# Patient Record
Sex: MALE | Employment: FULL TIME | URBAN - METROPOLITAN AREA
[De-identification: names, ages, dates, MRNs, and addresses within clinical notes are randomized per-mention and may not be internally consistent; named-entity substitution may affect disease eponyms.]

---

## 2019-01-04 ENCOUNTER — OFFICE VISIT (OUTPATIENT)
Dept: PEDIATRICS | Facility: CLINIC | Age: 24
End: 2019-01-04
Payer: COMMERCIAL

## 2019-01-04 VITALS
SYSTOLIC BLOOD PRESSURE: 116 MMHG | HEART RATE: 70 BPM | TEMPERATURE: 98.8 F | OXYGEN SATURATION: 97 % | DIASTOLIC BLOOD PRESSURE: 64 MMHG | HEIGHT: 73 IN | BODY MASS INDEX: 27.67 KG/M2 | WEIGHT: 208.8 LBS

## 2019-01-04 DIAGNOSIS — Z00.00 ROUTINE HISTORY AND PHYSICAL EXAMINATION OF ADULT: Primary | ICD-10-CM

## 2019-01-04 DIAGNOSIS — Z23 NEED FOR PROPHYLACTIC VACCINATION AND INOCULATION AGAINST INFLUENZA: ICD-10-CM

## 2019-01-04 PROCEDURE — 99385 PREV VISIT NEW AGE 18-39: CPT | Mod: 25 | Performed by: INTERNAL MEDICINE

## 2019-01-04 PROCEDURE — 90471 IMMUNIZATION ADMIN: CPT | Performed by: INTERNAL MEDICINE

## 2019-01-04 PROCEDURE — 80061 LIPID PANEL: CPT | Performed by: INTERNAL MEDICINE

## 2019-01-04 PROCEDURE — 90686 IIV4 VACC NO PRSV 0.5 ML IM: CPT | Performed by: INTERNAL MEDICINE

## 2019-01-04 PROCEDURE — 36415 COLL VENOUS BLD VENIPUNCTURE: CPT | Performed by: INTERNAL MEDICINE

## 2019-01-04 PROCEDURE — 82947 ASSAY GLUCOSE BLOOD QUANT: CPT | Performed by: INTERNAL MEDICINE

## 2019-01-04 RX ORDER — MULTIPLE VITAMINS W/ MINERALS TAB 9MG-400MCG
1 TAB ORAL DAILY
COMMUNITY

## 2019-01-04 RX ORDER — IBUPROFEN 200 MG
200 TABLET ORAL DAILY PRN
COMMUNITY
End: 2020-07-22

## 2019-01-04 RX ORDER — ACETAMINOPHEN 500 MG
1000 TABLET ORAL DAILY PRN
COMMUNITY
Start: 2016-01-26 | End: 2020-07-22

## 2019-01-04 ASSESSMENT — ENCOUNTER SYMPTOMS
HEARTBURN: 0
DIZZINESS: 0
WEAKNESS: 0
ABDOMINAL PAIN: 0
DIARRHEA: 0
CHILLS: 0
FEVER: 0
MYALGIAS: 0
HEADACHES: 0
COUGH: 0
PARESTHESIAS: 0
PALPITATIONS: 0
CONSTIPATION: 0
EYE PAIN: 0
FREQUENCY: 0
ARTHRALGIAS: 0
SORE THROAT: 0
HEMATOCHEZIA: 0
HEMATURIA: 0
NAUSEA: 0
JOINT SWELLING: 0
SHORTNESS OF BREATH: 0
DYSURIA: 0
NERVOUS/ANXIOUS: 0

## 2019-01-04 ASSESSMENT — MIFFLIN-ST. JEOR: SCORE: 2000.99

## 2019-01-04 NOTE — PROGRESS NOTES
SUBJECTIVE:   CC: Foreign Niño is an 23 year old male who presents for preventative health visit.     Physical   Annual:     Getting at least 3 servings of Calcium per day:  Yes    Bi-annual eye exam:  NO    Dental care twice a year:  Yes    Sleep apnea or symptoms of sleep apnea:  None    Diet:  Regular (no restrictions)    Frequency of exercise:  4-5 days/week    Duration of exercise:  30-45 minutes    Taking medications regularly:  Not Applicable    Additional concerns today:  No    PHQ-2 Total Score: 0          Today's PHQ-2 Score:   PHQ-2 ( 1999 Pfizer) 1/4/2019   Q1: Little interest or pleasure in doing things 0   Q2: Feeling down, depressed or hopeless 0   PHQ-2 Score 0   Q1: Little interest or pleasure in doing things Not at all   Q2: Feeling down, depressed or hopeless Not at all   PHQ-2 Score 0       Abuse: Current or Past(Physical, Sexual or Emotional)- No  Do you feel safe in your environment? Yes    Social History     Tobacco Use     Smoking status: Not on file   Substance Use Topics     Alcohol use: Not on file     No flowsheet data found.    Last PSA: No results found for: PSA    Reviewed orders with patient. Reviewed health maintenance and updated orders accordingly - Yes  Labs reviewed in EPIC    Reviewed and updated as needed this visit by clinical staff         Reviewed and updated as needed this visit by Provider            Review of Systems   Constitutional: Negative for chills and fever.   HENT: Negative for congestion, ear pain, hearing loss and sore throat.    Eyes: Negative for pain and visual disturbance.   Respiratory: Negative for cough and shortness of breath.    Cardiovascular: Negative for chest pain, palpitations and peripheral edema.   Gastrointestinal: Negative for abdominal pain, constipation, diarrhea, heartburn, hematochezia and nausea.   Genitourinary: Negative for discharge, dysuria, frequency, genital sores, hematuria, impotence and urgency.   Musculoskeletal: Negative for  "arthralgias, joint swelling and myalgias.   Skin: Negative for rash.   Neurological: Negative for dizziness, weakness, headaches and paresthesias.   Psychiatric/Behavioral: Negative for mood changes. The patient is not nervous/anxious.          OBJECTIVE:   /64 (BP Location: Right arm, Patient Position: Chair, Cuff Size: Adult Large)   Pulse 70   Temp 98.8  F (37.1  C) (Oral)   Ht 1.854 m (6' 1\")   Wt 94.7 kg (208 lb 12.8 oz)   SpO2 97%   BMI 27.55 kg/m      Physical Exam  GENERAL: healthy, alert and no distress  EYES: Eyes grossly normal to inspection, PERRL and conjunctivae and sclerae normal  HENT: ear canals and TM's normal, nose and mouth without ulcers or lesions  NECK: no adenopathy, no asymmetry, masses, or scars and thyroid normal to palpation  RESP: lungs clear to auscultation - no rales, rhonchi or wheezes  CV: regular rate and rhythm, normal S1 S2, no S3 or S4, no murmur, click or rub, no peripheral edema and peripheral pulses strong  ABDOMEN: soft, nontender, no hepatosplenomegaly, no masses and bowel sounds normal  MS: no gross musculoskeletal defects noted, no edema  SKIN: no suspicious lesions or rashes  NEURO: Normal strength and tone, mentation intact and speech normal  PSYCH: mentation appears normal, affect normal/bright    Diagnostic Test Results:  Results for orders placed or performed in visit on 01/04/19   Lipid panel reflex to direct LDL Fasting   Result Value Ref Range    Cholesterol 169 <200 mg/dL    Triglycerides 93 <150 mg/dL    HDL Cholesterol 58 >39 mg/dL    LDL Cholesterol Calculated 92 <100 mg/dL    Non HDL Cholesterol 111 <130 mg/dL   Glucose   Result Value Ref Range    Glucose 80 70 - 99 mg/dL       ASSESSMENT/PLAN:       ICD-10-CM    1. Routine history and physical examination of adult Z00.00 Lipid panel reflex to direct LDL Fasting     Glucose   2. Need for prophylactic vaccination and inoculation against influenza Z23 FLU VACCINE, SPLIT VIRUS, IM (QUADRIVALENT) " "[57335]- >3 YRS     Vaccine Administration, Initial [84582]       COUNSELING:   Reviewed preventive health counseling, as reflected in patient instructions    BP Readings from Last 1 Encounters:   01/04/19 116/64     Estimated body mass index is 27.55 kg/m  as calculated from the following:    Height as of this encounter: 1.854 m (6' 1\").    Weight as of this encounter: 94.7 kg (208 lb 12.8 oz).      Weight management plan: Discussed healthy diet and exercise guidelines     reports that  has never smoked. His smokeless tobacco use includes chew.      Counseling Resources:  ATP IV Guidelines  Pooled Cohorts Equation Calculator  FRAX Risk Assessment  ICSI Preventive Guidelines  Dietary Guidelines for Americans, 2010  USDA's MyPlate  ASA Prophylaxis  Lung CA Screening    Alexys Del Valle MD, MD  [facility]  "

## 2019-01-04 NOTE — PROGRESS NOTES
Injectable Influenza Immunization Documentation    1.  Is the person to be vaccinated sick today?   No    2. Does the person to be vaccinated have an allergy to a component   of the vaccine?   No  Egg Allergy Algorithm Link    3. Has the person to be vaccinated ever had a serious reaction   to influenza vaccine in the past?   No    4. Has the person to be vaccinated ever had Guillain-Barré syndrome?   No    Form completed by Lola Little MA           Answers for HPI/ROS submitted by the patient on 1/4/2019   Annual Exam:  Frequency of exercise:: 4-5 days/week  Getting at least 3 servings of Calcium per day:: Yes  Diet:: Regular (no restrictions)  Taking medications regularly:: Not Applicable  Medication side effects:: Not applicable  Bi-annual eye exam:: NO  Dental care twice a year:: Yes  Sleep apnea or symptoms of sleep apnea:: None  Negative for the following: abdominal pain, Blood in stool, Blood in urine, chest pain, chills, congestion, constipation, cough, diarrhea, dizziness, ear pain, eye pain, nervous/anxious, fever, frequency, genital sores, headaches, hearing loss, heartburn, arthralgias, joint swelling, peripheral edema, mood changes, myalgias, nausea, dysuria, palpitations, Skin sensation changes, sore throat, urgency, rash, shortness of breath, visual disturbance, weakness  impotence: No  penile discharge: No  Additional concerns today:: No  Duration of exercise:: 30-45 minutes

## 2019-01-04 NOTE — PATIENT INSTRUCTIONS
1) Flu vaccine    2) Stop at lab downstairs today. If you have Mychart, your results will be sent this way. If you do not have Mychart, you can sign up by following the link in this after visit summary.        Preventive Health Recommendations  Male Ages 21 - 25     Yearly exam:             See your health care provider every year in order to  o   Review health changes.   o   Discuss preventive care.    o   Review your medicines if your doctor has prescribed any.    You should be tested each year for STDs (sexually transmitted diseases).     Talk to your provider about cholesterol testing.      If you are at risk for diabetes, you should have a diabetes test (fasting glucose).    Shots: Get a flu shot each year. Get a tetanus shot every 10 years.     Nutrition:    Eat at least 5 servings of fruits and vegetables daily.     Eat whole-grain bread, whole-wheat pasta and brown rice instead of white grains and rice.     Get adequate calcium and Vitamin D.     Lifestyle    Exercise for at least 150 minutes a week (30 minutes a day, 5 days a week). This will help you control your weight and prevent disease.     Limit alcohol to one drink per day.     No smoking.     Wear sunscreen to prevent skin cancer.     See your dentist every six months for an exam and cleaning.

## 2019-01-05 LAB
CHOLEST SERPL-MCNC: 169 MG/DL
GLUCOSE SERPL-MCNC: 80 MG/DL (ref 70–99)
HDLC SERPL-MCNC: 58 MG/DL
LDLC SERPL CALC-MCNC: 92 MG/DL
NONHDLC SERPL-MCNC: 111 MG/DL
TRIGL SERPL-MCNC: 93 MG/DL

## 2020-07-06 ENCOUNTER — TELEPHONE (OUTPATIENT)
Dept: PEDIATRICS | Facility: CLINIC | Age: 25
End: 2020-07-06

## 2020-07-06 NOTE — TELEPHONE ENCOUNTER
1st attempt:  Due to change in clinic hours, patient's appointment time on 7/22/20 needs to be rescheduled.  Left message for patient informing him of this.  Requested a callback to reschedule time.  Provided clinic number.    Tracee Espino

## 2020-07-17 NOTE — PROGRESS NOTES
Pre-Visit Planning     Future Appointments   Date Time Provider Department Center   7/22/2020  1:20 PM Sb Cunningham MD [facility]     Arrival Time for this Appointment: 12:55 PM   Appointment Notes for this encounter:   Data Unavailable    Questionnaires Reviewed/Assigned  No additional questionnaires are needed        Patient preferred phone number: [phone]    Unable to reach. Left voicemail.

## 2020-07-22 ENCOUNTER — OFFICE VISIT (OUTPATIENT)
Dept: PEDIATRICS | Facility: CLINIC | Age: 25
End: 2020-07-22
Payer: COMMERCIAL

## 2020-07-22 VITALS
HEIGHT: 73 IN | BODY MASS INDEX: 27.96 KG/M2 | DIASTOLIC BLOOD PRESSURE: 72 MMHG | TEMPERATURE: 98.2 F | RESPIRATION RATE: 16 BRPM | WEIGHT: 211 LBS | OXYGEN SATURATION: 99 % | SYSTOLIC BLOOD PRESSURE: 116 MMHG | HEART RATE: 63 BPM

## 2020-07-22 DIAGNOSIS — Z00.00 ROUTINE GENERAL MEDICAL EXAMINATION AT A HEALTH CARE FACILITY: Primary | ICD-10-CM

## 2020-07-22 PROCEDURE — 99395 PREV VISIT EST AGE 18-39: CPT | Performed by: INTERNAL MEDICINE

## 2020-07-22 SDOH — HEALTH STABILITY: MENTAL HEALTH: HOW OFTEN DO YOU HAVE A DRINK CONTAINING ALCOHOL?: 2-4 TIMES A MONTH

## 2020-07-22 SDOH — HEALTH STABILITY: MENTAL HEALTH: HOW OFTEN DO YOU HAVE 6 OR MORE DRINKS ON ONE OCCASION?: MONTHLY

## 2020-07-22 SDOH — HEALTH STABILITY: MENTAL HEALTH: HOW MANY STANDARD DRINKS CONTAINING ALCOHOL DO YOU HAVE ON A TYPICAL DAY?: 5 OR 6

## 2020-07-22 ASSESSMENT — MIFFLIN-ST. JEOR: SCORE: 2002.71

## 2020-07-22 NOTE — PROGRESS NOTES
SUBJECTIVE:   CC: Foreign Niño is an 25 year old male who presents for preventative health visit.     Healthy Habits:    Getting at least 3 servings of Calcium per day:  Yes    Bi-annual eye exam:  Yes    Dental care twice a year:  Yes    Sleep apnea or symptoms of sleep apnea:  None    Diet:  Regular (no restrictions)    Frequency of exercise:  4-5 days/week    Duration of exercise:  45-60 minutes    Taking medications regularly:  Not Applicable    Barriers to taking medications:  Not applicable    Medication side effects:  Not applicable    PHQ-2 Total Score:    Additional concerns today:  No      Eye exam with ophthalmology on this date: 09/2019, [facility](in [place])    Joint Pain    Onset: 10 days, in April, painful to do push ups and thereafter for 10 days.     Description:   Location: right shoulder  Character: cracks/pops    Intensity: mild, wrong movement increases pain    Progression of Symptoms: same    Accompanying Signs & Symptoms:  Other symptoms: none    History:   Previous similar pain: no       Precipitating factors:   Trauma or overuse: YES- push ups    Alleviating factors:  Improved by: rest/inactivity    Sx are resolved at this time       Today's PHQ-2 Score:   PHQ-2 ( 1999 Pfizer) 7/22/2020   Q1: Little interest or pleasure in doing things 0   Q2: Feeling down, depressed or hopeless 0   PHQ-2 Score 0   Q1: Little interest or pleasure in doing things -   Q2: Feeling down, depressed or hopeless -   PHQ-2 Score -     Abuse: Current or Past(Physical, Sexual or Emotional)- No  Do you feel safe in your environment? Yes      Social History     Tobacco Use     Smoking status: Never Smoker     Smokeless tobacco: Current User     Types: Chew   Substance Use Topics     Alcohol use: Yes     Frequency: 2-4 times a month     Drinks per session: 5 or 6     Binge frequency: Monthly     If you drink alcohol do you typically have >3 drinks per day or >7 drinks per week? No    Alcohol Use 1/4/2019  "  Prescreen: >3 drinks/day or >7 drinks/week? Yes       Reviewed orders with patient. Reviewed health maintenance and updated orders accordingly - Yes    Reviewed and updated as needed this visit by Provider  Tobacco  Allergies  Meds  Problems  Med Hx  Surg Hx  Fam Hx          Review of Systems  CONSTITUTIONAL: NEGATIVE for fever, chills, change in weight  INTEGUMENTARY/SKIN: NEGATIVE for worrisome rashes, moles or lesions  EYES: NEGATIVE for vision changes or irritation  ENT: NEGATIVE for ear, mouth and throat problems  RESP: NEGATIVE for significant cough or SOB  CV: NEGATIVE for chest pain, palpitations or peripheral edema  GI: NEGATIVE for nausea, abdominal pain, heartburn, or change in bowel habits   male: negative for dysuria, hematuria, decreased urinary stream, erectile dysfunction, urethral discharge  MUSCULOSKELETAL: NEGATIVE for significant arthralgias or myalgia  NEURO: NEGATIVE for weakness, dizziness or paresthesias  PSYCHIATRIC: NEGATIVE for changes in mood or affect    OBJECTIVE:   /72 (BP Location: Right arm, Patient Position: Sitting, Cuff Size: Adult Regular)   Pulse 63   Temp 98.2  F (36.8  C) (Tympanic)   Resp 16   Ht 1.857 m (6' 1.11\")   Wt 95.7 kg (211 lb)   SpO2 99%   BMI 27.75 kg/m      Physical Exam  GENERAL: healthy, alert and no distress  EYES: Eyes grossly normal to inspection, PERRL and conjunctivae and sclerae normal  HENT: ear canals and TM's normal, nose and mouth without ulcers or lesions  NECK: no adenopathy, no asymmetry, masses, or scars and thyroid normal to palpation  RESP: lungs clear to auscultation - no rales, rhonchi or wheezes  CV: regular rate and rhythm, normal S1 S2, no S3 or S4, no murmur, click or rub, no peripheral edema and peripheral pulses strong  ABDOMEN: soft, nontender, no hepatosplenomegaly, no masses and bowel sounds normal  MS: Per HPI   SKIN: no suspicious lesions or rashes  NEURO: Normal strength and tone, mentation intact and speech " "normal  PSYCH: mentation appears normal, affect normal/bright      ASSESSMENT/PLAN:       ICD-10-CM    1. Routine general medical examination at a health care facility  Z00.00        COUNSELING:   Reviewed preventive health counseling, as reflected in patient instructions    Estimated body mass index is 27.75 kg/m  as calculated from the following:    Height as of this encounter: 1.857 m (6' 1.11\").    Weight as of this encounter: 95.7 kg (211 lb).     Weight management plan: Discussed healthy diet and exercise guidelines     reports that he has never smoked. His smokeless tobacco use includes chew.      Sb Cunningham MD  [facility]  "

## 2020-07-22 NOTE — PATIENT INSTRUCTIONS
Preventive Health Recommendations    Yearly exam:             See your health care provider every year in order to  o   Review health changes.   o   Discuss preventive care.      Shots: Get a flu shot each year. Get a tetanus shot every 10 years.     Nutrition:    Eat at least 5 servings of fruits and vegetables daily.     Eat whole-grain bread, whole-wheat pasta and brown rice instead of white grains and rice.     Get adequate calcium and Vitamin D.     Lifestyle    Exercise for at least 150 minutes a week (30 minutes a day, 5 days a week). This will help you control your weight and prevent disease.     Limit alcohol to one drink per day.     No smoking.     Wear sunscreen to prevent skin cancer.     See your dentist every six months for an exam and cleaning.

## 2021-01-03 ENCOUNTER — HEALTH MAINTENANCE LETTER (OUTPATIENT)
Age: 26
End: 2021-01-03

## 2021-10-10 ENCOUNTER — HEALTH MAINTENANCE LETTER (OUTPATIENT)
Age: 26
End: 2021-10-10

## 2022-05-11 ENCOUNTER — OFFICE VISIT (OUTPATIENT)
Dept: INTERNAL MEDICINE | Facility: CLINIC | Age: 27
End: 2022-05-11
Payer: COMMERCIAL

## 2022-05-11 VITALS
HEART RATE: 61 BPM | SYSTOLIC BLOOD PRESSURE: 143 MMHG | HEIGHT: 73 IN | WEIGHT: 219.3 LBS | DIASTOLIC BLOOD PRESSURE: 81 MMHG | BODY MASS INDEX: 29.06 KG/M2 | OXYGEN SATURATION: 100 %

## 2022-05-11 DIAGNOSIS — R03.0 ELEVATED BLOOD PRESSURE READING WITHOUT DIAGNOSIS OF HYPERTENSION: ICD-10-CM

## 2022-05-11 DIAGNOSIS — Z00.00 ROUTINE GENERAL MEDICAL EXAMINATION AT A HEALTH CARE FACILITY: Primary | ICD-10-CM

## 2022-05-11 DIAGNOSIS — Z86.16 HISTORY OF COVID-19: ICD-10-CM

## 2022-05-11 LAB
ALBUMIN SERPL-MCNC: 4 G/DL (ref 3.5–5)
ALP SERPL-CCNC: 43 U/L (ref 45–120)
ALT SERPL W P-5'-P-CCNC: 27 U/L (ref 0–45)
ANION GAP SERPL CALCULATED.3IONS-SCNC: 12 MMOL/L (ref 5–18)
AST SERPL W P-5'-P-CCNC: 32 U/L (ref 0–40)
BILIRUB SERPL-MCNC: 0.8 MG/DL (ref 0–1)
BUN SERPL-MCNC: 8 MG/DL (ref 8–22)
CALCIUM SERPL-MCNC: 9.5 MG/DL (ref 8.5–10.5)
CHLORIDE BLD-SCNC: 97 MMOL/L (ref 98–107)
CHOLEST SERPL-MCNC: 155 MG/DL
CO2 SERPL-SCNC: 24 MMOL/L (ref 22–31)
CREAT SERPL-MCNC: 0.85 MG/DL (ref 0.7–1.3)
ERYTHROCYTE [DISTWIDTH] IN BLOOD BY AUTOMATED COUNT: 11 % (ref 10–15)
FASTING STATUS PATIENT QL REPORTED: YES
GFR SERPL CREATININE-BSD FRML MDRD: >90 ML/MIN/1.73M2
GLUCOSE BLD-MCNC: 84 MG/DL (ref 70–125)
HCT VFR BLD AUTO: 37.1 % (ref 40–53)
HDLC SERPL-MCNC: 53 MG/DL
HGB BLD-MCNC: 13.1 G/DL (ref 13.3–17.7)
LDLC SERPL CALC-MCNC: 92 MG/DL
MCH RBC QN AUTO: 31.6 PG (ref 26.5–33)
MCHC RBC AUTO-ENTMCNC: 35.3 G/DL (ref 31.5–36.5)
MCV RBC AUTO: 90 FL (ref 78–100)
PLATELET # BLD AUTO: 214 10E3/UL (ref 150–450)
POTASSIUM BLD-SCNC: 4 MMOL/L (ref 3.5–5)
PROT SERPL-MCNC: 7.2 G/DL (ref 6–8)
RBC # BLD AUTO: 4.14 10E6/UL (ref 4.4–5.9)
SODIUM SERPL-SCNC: 133 MMOL/L (ref 136–145)
TRIGL SERPL-MCNC: 49 MG/DL
WBC # BLD AUTO: 5.5 10E3/UL (ref 4–11)

## 2022-05-11 PROCEDURE — 36415 COLL VENOUS BLD VENIPUNCTURE: CPT | Performed by: INTERNAL MEDICINE

## 2022-05-11 PROCEDURE — 80061 LIPID PANEL: CPT | Performed by: INTERNAL MEDICINE

## 2022-05-11 PROCEDURE — 80053 COMPREHEN METABOLIC PANEL: CPT | Performed by: INTERNAL MEDICINE

## 2022-05-11 PROCEDURE — 85027 COMPLETE CBC AUTOMATED: CPT | Performed by: INTERNAL MEDICINE

## 2022-05-11 PROCEDURE — 99395 PREV VISIT EST AGE 18-39: CPT | Performed by: INTERNAL MEDICINE

## 2022-05-11 NOTE — PROGRESS NOTES
SUBJECTIVE:   CC: Foreign Niño is an 27 year old male who presents for preventative health visit.     Patient has been advised of split billing requirements and indicates understanding: Yes  Healthy Habits:     Getting at least 3 servings of Calcium per day:  Yes    Bi-annual eye exam:  Yes    Dental care twice a year:  Yes    Sleep apnea or symptoms of sleep apnea:  None    Diet:  Regular (no restrictions)    Frequency of exercise:  2-3 days/week    Duration of exercise:  45-60 minutes    Taking medications regularly:  Not Applicable    Medication side effects:  None    PHQ-2 Total Score: 0    Additional concerns today:  No      Today's PHQ-2 Score:   PHQ-2 ( 1999 Pfizer) 5/8/2022   Q1: Little interest or pleasure in doing things 0   Q2: Feeling down, depressed or hopeless 0   PHQ-2 Score 0   PHQ-2 Total Score (12-17 Years)- Positive if 3 or more points; Administer PHQ-A if positive -   Q1: Little interest or pleasure in doing things Not at all   Q2: Feeling down, depressed or hopeless Not at all   PHQ-2 Score 0       Abuse: Current or Past(Physical, Sexual or Emotional)- No  Do you feel safe in your environment? Yes    Have you ever done Advance Care Planning? (For example, a Health Directive, POLST, or a discussion with a medical provider or your loved ones about your wishes): Yes, patient states has an Advance Care Planning document and will bring a copy to the clinic.    Social History     Tobacco Use     Smoking status: Never Smoker     Smokeless tobacco: Current User     Types: Chew   Substance Use Topics     Alcohol use: Yes     If you drink alcohol do you typically have >3 drinks per day or >7 drinks per week? No    Alcohol Use 5/8/2022   Prescreen: >3 drinks/day or >7 drinks/week? No   Prescreen: >3 drinks/day or >7 drinks/week? -       Last PSA: No results found for: PSA    Reviewed orders with patient. Reviewed health maintenance and updated orders accordingly - Yes      Reviewed and updated as  "needed this visit by clinical staff   Tobacco  Allergies  Meds                Reviewed and updated as needed this visit by Provider                  Review of Systems  CONSTITUTIONAL: NEGATIVE for fever, chills, change in weight  INTEGUMENTARY/SKIN: NEGATIVE for worrisome rashes, moles or lesions  EYES: NEGATIVE for vision changes or irritation  ENT: NEGATIVE for ear, mouth and throat problems  RESP: NEGATIVE for significant cough or SOB  CV: NEGATIVE for chest pain, palpitations or peripheral edema  GI: NEGATIVE for nausea, abdominal pain, heartburn, or change in bowel habits   male: negative for dysuria, hematuria, decreased urinary stream, erectile dysfunction, urethral discharge  MUSCULOSKELETAL: NEGATIVE for significant arthralgias or myalgia  NEURO: NEGATIVE for weakness, dizziness or paresthesias  PSYCHIATRIC: NEGATIVE for changes in mood or affect    OBJECTIVE:   BP (!) 151/76   Pulse 61   Ht 1.86 m (6' 1.23\")   Wt 99.5 kg (219 lb 4.8 oz)   SpO2 100%   BMI 28.75 kg/m      Physical Exam    General: Alert, in no distress  Skin: No significant lesion seen.  Eyes/nose/throat: Eyes without scleral icterus, eye movements normal, pupils equal and reactive, oropharynx clear  + Soft earwax on both sides, occluding tympanic analysis  MSK: Neck with good ROM  Lymphatic: Neck without adenopathy or masses  Endocrine: Thyroid with no nodules to palpation  Pulm: Lungs clear to auscultation bilaterally  Cardiac: Heart with regular rate and rhythm, no murmur or gallop  GI: Abdomen soft, nontender. No palpable enlargement of liver or spleen  MSK: Extremities no tenderness or edema  Neuro: Moves all extremities, without focal weakness  Psych: Alert, normal mental status. Normal affect and speech      ASSESSMENT/PLAN:     Initial visit [facility] internal medicine and preventive exam for this historically very healthy 27-year-old man, who works in commercial real estate, and played football center at [employer] " [facility],.    He takes no prescription medication.  He has excellent habits (except a little chewing tobacco)  Is a little overweight by BMI criteria, but he told me he was very careful to not gain weight after his football playing days completed.    He is fasting this afternoon, so we will get a comprehensive metabolic panel, blood cell counts, and lipid profile.    Issues are as follows    History COVID infection March 2022  Was not vaccinated.  Symptoms fever 1 day, + Home test, cold symptoms 3 days, then full recover  He should have immunity from his \"natural infection.\"  Perhaps he might consider getting a shot of the next generation COVID-vaccine when available.    History of lumbar decompression surgery 2014 because of herniated disc, asymptomatic as of 2022  Nowadays enjoys yoga    Back Pain (Injured back in 2014, had a herniated disc.)  12/19/2014 DECOMPRESSION LEFT L4-5  [facility]   POSTOPERATIVE DIAGNOSIS   1. Left leg pain in the 5th distribution secondary to #2.   2. Left L4-L5, central left parasagittal disk herniation with caudal extrusion causing impingement of the traversing left 5th root.   3. Failure of conservative measures.     PROCEDURES PERFORMED  1. Left L4 inferior hemilaminotomy.   2. Left L5 superior hemilaminotomy.   3. Left L4-L5 medial facetectomy, subarticular decompression of the traversing left 5th root.   4. Left L4-L5 partial diskectomy further central and subarticular decompression of the traversing neural anatomy in particular to the left L5 root.      History of bilateral posterior cruciate ligament arthroscopic knee surgery done when he was in high school.  He got a good result, which enabled him to play college football.    Bilateral earwax.  Recommended that he use over-the-counter wax dissolving eardrop, example brand Debrox or Murine    Blood pressure elevation, may be a sporadic reading, but I encouraged him to collect some blood pressure data " "outside the clinic  BP Readings from Last 6 Encounters:   05/11/22 (!) 151/76   07/22/20 116/72   01/04/19 116/64     He might use the blood pressure machine that is available in most commercial pharmacies.  Or ask a friend or family member who is a healthcare professional to take a measurement.  I told him that ideal healthy blood pressure for his age would be 120/80 measured at rest in the seated position right upper arm, after sitting quietly for 3 to 4 minutes    Few times a month beer  Eats healthy  Never smoker    Uses some chewing tobacco  I reminded him that chewing tobacco would still trigger screening cotinine tests in blood or urine, as would be done during insurance exams.    Elevated BMI 28.75  I might suggest he trim off maybe 10 pounds or so, which would help with blood pressure    Family to colon polyps (father), and second-degree relative who had colon cancer (paternal grandmother)  At age 27, there would not be any screening that he needs to worry about right now.  Perhaps at age 40, he could start colon screening, and the modalities available by the time he reaches age 40 might be more convenient than what we have today, which is stool testing and colonoscopy    Received usual childhood vaccines.  Consider COVID vaccination when the next generation vaccines available    COUNSELING:   Reviewed preventive health counseling, as reflected in patient instructions       Healthy diet/nutrition    Estimated body mass index is 28.75 kg/m  as calculated from the following:    Height as of this encounter: 1.86 m (6' 1.23\").    Weight as of this encounter: 99.5 kg (219 lb 4.8 oz).     Weight management plan: Discussed healthy diet and exercise guidelines    He reports that he has never smoked. His smokeless tobacco use includes chew.  Tobacco Cessation Action Plan:   Information offered: Patient not interested at this time      FERNANDO WAITE MD  [facility]  "

## 2022-05-11 NOTE — PATIENT INSTRUCTIONS
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,.    He takes no prescription medication.  He has excellent habits (except a little chewing tobacco)  Is a little overweight by BMI criteria, but he told me he was very careful to not gain weight after his football playing days completed.    He is fasting this afternoon, so we will get a comprehensive metabolic panel, blood cell counts, and lipid profile.    Issues are as follows    History COVID infection March 2022  Was not vaccinated.  Symptoms fever 1 day, + Home test, cold symptoms 3 days, then full recover  He should have immunity from his \"natural infection.\"  Perhaps he might consider getting a shot of the next generation COVID-vaccine when available.    History of lumbar decompression surgery 2014 because of herniated disc, asymptomatic as of 2022  Nowadays enjoys yoga    Back Pain (Injured back in 2014, had a herniated disc.)  12/19/2014 DECOMPRESSION LEFT L4-5  [facility]   POSTOPERATIVE DIAGNOSIS   1. Left leg pain in the 5th distribution secondary to #2.   2. Left L4-L5, central left parasagittal disk herniation with caudal extrusion causing impingement of the traversing left 5th root.   3. Failure of conservative measures.     PROCEDURES PERFORMED  1. Left L4 inferior hemilaminotomy.   2. Left L5 superior hemilaminotomy.   3. Left L4-L5 medial facetectomy, subarticular decompression of the traversing left 5th root.   4. Left L4-L5 partial diskectomy further central and subarticular decompression of the traversing neural anatomy in particular to the left L5 root.      History of bilateral posterior cruciate ligament arthroscopic knee surgery done when he was in high school.  He got a good result, which enabled him to play college football.    Bilateral earwax.  Recommended " that he use over-the-counter wax dissolving eardrop, example brand Debrox or Murine    Blood pressure elevation, may be a sporadic reading, but I encouraged him to collect some blood pressure data outside the clinic  BP Readings from Last 6 Encounters:   05/11/22 (!) 151/76   07/22/20 116/72   01/04/19 116/64     He might use the blood pressure machine that is available in most commercial pharmacies.  Or ask a friend or family member who is a healthcare professional to take a measurement.  I told him that ideal healthy blood pressure for his age would be 120/80 measured at rest in the seated position right upper arm, after sitting quietly for 3 to 4 minutes    Few times a month beer  Eats healthy  Never smoker    Uses some chewing tobacco  I reminded him that chewing tobacco would still trigger screening cotinine tests in blood or urine, as would be done during insurance exams.    Elevated BMI 28.75  I might suggest he trim off maybe 10 pounds or so, which would help with blood pressure    Family to colon polyps (father), and second-degree relative who had colon cancer (paternal grandmother)  At age 27, there would not be any screening that he needs to worry about right now.  Perhaps at age 41, he could start colon screening, and the modalities available by the time he reaches age 41 might be more convenient than what we have today, which is stool testing and colonoscopy    Received usual childhood vaccines.  Consider COVID vaccination when the next generation vaccines available

## 2022-09-18 ENCOUNTER — HEALTH MAINTENANCE LETTER (OUTPATIENT)
Age: 27
End: 2022-09-18

## 2022-11-07 ENCOUNTER — OFFICE VISIT (OUTPATIENT)
Dept: URGENT CARE | Facility: URGENT CARE | Age: 27
End: 2022-11-07
Payer: COMMERCIAL

## 2022-11-07 VITALS
TEMPERATURE: 98.2 F | WEIGHT: 217 LBS | DIASTOLIC BLOOD PRESSURE: 72 MMHG | BODY MASS INDEX: 28.45 KG/M2 | SYSTOLIC BLOOD PRESSURE: 127 MMHG | HEART RATE: 76 BPM | OXYGEN SATURATION: 99 %

## 2022-11-07 DIAGNOSIS — M54.50 ACUTE MIDLINE LOW BACK PAIN WITHOUT SCIATICA: Primary | ICD-10-CM

## 2022-11-07 PROCEDURE — 99213 OFFICE O/P EST LOW 20 MIN: CPT

## 2022-11-07 RX ORDER — METHYLPREDNISOLONE 4 MG
TABLET, DOSE PACK ORAL
Qty: 21 TABLET | Refills: 0 | Status: SHIPPED | OUTPATIENT
Start: 2022-11-07 | End: 2023-05-02

## 2022-11-07 RX ORDER — CYCLOBENZAPRINE HCL 10 MG
10 TABLET ORAL 3 TIMES DAILY PRN
Qty: 20 TABLET | Refills: 0 | Status: SHIPPED | OUTPATIENT
Start: 2022-11-07 | End: 2023-05-02

## 2022-11-08 NOTE — PROGRESS NOTES
Assessment & Plan     Acute midline low back pain without sciatica    - methylPREDNISolone (MEDROL DOSEPAK) 4 MG tablet therapy pack; Follow Package Directions  - cyclobenzaprine (FLEXERIL) 10 MG tablet; Take 1 tablet (10 mg) by mouth 3 times daily as needed for muscle spasms    Treat with Medrol dosepak which has worked well for patient in past.  Will add Flexeril prn spasms.  Continue with heat and stretching/yoga prn.  Close Follow-up if no change or new or worsening sx prn.    Lisa Rodriguez MD  [facility] EFFIE Carrasquillo is a 27 year old, presenting for the following health issues:  Urgent Care and Back Pain (Threw lower back out this morning at 7:30am.)      HPI   Hx of L4-L5 discectomy in 2014.  Played football for [employer].  Since that time has had a few flares of back pain-- Medrol dosepak has been prescribed each time with good results.  Does yoga to maintain back and core strength.    Today was getting out of car- and at step #7 back suddenly gave out causing acute pain in the lower central lumber spine.  Denies any radicular sx.  No bowel or bladder issues.  No weakness of BLEs.  Using cane for walking.    Works in commercial real estate.        Review of Systems   Constitutional, HEENT, cardiovascular, pulmonary, GI, , musculoskeletal, neuro, skin, endocrine and psych systems are negative, except as otherwise noted.      Objective    /72   Pulse 76   Temp 98.2  F (36.8  C) (Tympanic)   Wt 98.4 kg (217 lb)   SpO2 99%   BMI 28.45 kg/m    Body mass index is 28.45 kg/m .  Physical Exam   GENERAL: healthy, alert and no distress  EYES: Eyes grossly normal to inspection, PERRL and conjunctivae and sclerae normal  MS: no gross musculoskeletal defects noted, no edema  SKIN: no suspicious lesions or rashes  BACK: localized pain and spasm in central low back, no sciatica.  Using cane to stabilize with walking.  Prefers to stand during appt today.  PSYCH: mentation  appears normal, affect normal/bright

## 2023-02-04 ENCOUNTER — OFFICE VISIT (OUTPATIENT)
Dept: URGENT CARE | Facility: URGENT CARE | Age: 28
End: 2023-02-04
Payer: COMMERCIAL

## 2023-02-04 ENCOUNTER — E-VISIT (OUTPATIENT)
Dept: URGENT CARE | Facility: CLINIC | Age: 28
End: 2023-02-04
Payer: COMMERCIAL

## 2023-02-04 VITALS
BODY MASS INDEX: 27.67 KG/M2 | TEMPERATURE: 97.8 F | OXYGEN SATURATION: 100 % | HEART RATE: 67 BPM | DIASTOLIC BLOOD PRESSURE: 79 MMHG | WEIGHT: 211 LBS | SYSTOLIC BLOOD PRESSURE: 124 MMHG

## 2023-02-04 DIAGNOSIS — H92.09 OTALGIA, UNSPECIFIED LATERALITY: Primary | ICD-10-CM

## 2023-02-04 DIAGNOSIS — H92.02 OTALGIA, LEFT: Primary | ICD-10-CM

## 2023-02-04 PROCEDURE — 99207 PR NON-BILLABLE SERV PER CHARTING: CPT | Performed by: INTERNAL MEDICINE

## 2023-02-04 PROCEDURE — 69209 REMOVE IMPACTED EAR WAX UNI: CPT | Mod: 50 | Performed by: PHYSICIAN ASSISTANT

## 2023-02-04 PROCEDURE — 99207 PR NO CHARGE LOS: CPT | Performed by: PHYSICIAN ASSISTANT

## 2023-02-04 ASSESSMENT — ENCOUNTER SYMPTOMS
SINUS PAIN: 0
CHILLS: 0
VOMITING: 0
NAUSEA: 0
TROUBLE SWALLOWING: 0
SORE THROAT: 1
FEVER: 0
COUGH: 0
DIARRHEA: 0
RHINORRHEA: 1

## 2023-02-04 NOTE — PROGRESS NOTES
SUBJECTIVE:   Foreign Niño is a 27 year old male presenting with a chief complaint of   Chief Complaint   Patient presents with     Urgent Care     Ear Problem     Left ear discomfort started 4 days ago, throat pain only in morning.       He is an established patient of [facility].    URI Adult    Onset of symptoms was 4 day(s) ago. Denies fevers, chills, cough, headache, nausea, vomiting, diarrhea.  Course of illness is same.    Severity mild  Current and Associated symptoms: runny nose, stuffy nose, ear pain left and post-nasal drip  Treatment measures tried include None tried.  Predisposing factors include None.      Review of Systems   Constitutional: Negative for chills and fever.   HENT: Positive for congestion, ear pain (mild discomfort. left ear fullness), postnasal drip, rhinorrhea and sore throat. Negative for ear discharge, sinus pain and trouble swallowing.    Respiratory: Negative for cough.    Gastrointestinal: Negative for diarrhea, nausea and vomiting.   All other systems reviewed and are negative.      History reviewed. No pertinent past medical history.  Family History   Problem Relation Age of Onset     Family History Negative Mother      Colon Polyps Father 36     Family History Negative Maternal Grandmother 78     Family History Negative Maternal Grandfather      Colon Cancer Paternal Grandmother 49        49  from colon cancer     Family History Negative Brother      Current Outpatient Medications   Medication Sig Dispense Refill     multivitamin w/minerals (THERA-VIT-M) tablet Take 1 tablet by mouth daily       cyclobenzaprine (FLEXERIL) 10 MG tablet Take 1 tablet (10 mg) by mouth 3 times daily as needed for muscle spasms 20 tablet 0     methylPREDNISolone (MEDROL DOSEPAK) 4 MG tablet therapy pack Follow Package Directions 21 tablet 0     Social History     Tobacco Use     Smoking status: Never     Smokeless tobacco: Current     Types: Chew   Substance Use Topics     Alcohol use: Yes        OBJECTIVE  /79   Pulse 67   Temp 97.8  F (36.6  C) (Tympanic)   Wt 95.7 kg (211 lb)   SpO2 100%   BMI 27.67 kg/m      Physical Exam  Constitutional:       General: He is not in acute distress.     Appearance: Normal appearance. He is normal weight. He is not ill-appearing or toxic-appearing.   HENT:      Head: Normocephalic and atraumatic.      Right Ear: There is impacted cerumen.      Left Ear: There is impacted cerumen.      Nose: Nose normal.      Mouth/Throat:      Mouth: Mucous membranes are moist.      Pharynx: No posterior oropharyngeal erythema.   Eyes:      Conjunctiva/sclera: Conjunctivae normal.   Cardiovascular:      Rate and Rhythm: Normal rate and regular rhythm.      Heart sounds: Normal heart sounds.   Pulmonary:      Effort: Pulmonary effort is normal.      Breath sounds: Normal breath sounds.   Skin:     General: Skin is warm and dry.   Neurological:      Mental Status: He is alert.         Labs:  No results found for this or any previous visit (from the past 24 hour(s)).    X-Ray was not done.    ASSESSMENT:      ICD-10-CM    1. Otalgia, left  H92.02            Medical Decision Making:    Differential Diagnosis:  URI Adult/Peds:  Otalgia    Serious Comorbid Conditions:  Adult:  None    PLAN:    URI Adult: TMs unable to be visualized initially on exam due to cerumen. Irrigation performed on bilateral ears by MA with resolution of cerumen impaction. No acute infection noticed on exam following irrigation. Patient instructed on his pain and discussed his sore throat, likely caused by dryness throughout the night.     Followup:    If not improving or if condition worsens, follow up with your Primary Care Provider, If not improving or if conditions worsens over the next 12-24 hours, go to the Emergency Department    There are no Patient Instructions on file for this visit.

## 2023-02-04 NOTE — PATIENT INSTRUCTIONS
Dear Foreign Niño,    We are sorry you are not feeling well. Based on the responses you provided, it is recommended that you be seen in-person in urgent care so we can better evaluate your symptoms and look in your ears to see if you have an ear infection. Please click here to find the nearest urgent care location to you.   You will not be charged for this Visit. Thank you for trusting us with your care.    Kymberly Delgadillo MD

## 2023-04-29 ASSESSMENT — ENCOUNTER SYMPTOMS
CHILLS: 0
FEVER: 0
PARESTHESIAS: 0
COUGH: 0
WEAKNESS: 0
DIZZINESS: 0
JOINT SWELLING: 0
DYSURIA: 0
ABDOMINAL PAIN: 0
NERVOUS/ANXIOUS: 0
FREQUENCY: 0
NAUSEA: 0
SORE THROAT: 0
CONSTIPATION: 0
EYE PAIN: 0
SHORTNESS OF BREATH: 0
HEADACHES: 0
HEARTBURN: 0
DIARRHEA: 0
MYALGIAS: 0
PALPITATIONS: 0
ARTHRALGIAS: 0
HEMATURIA: 0
HEMATOCHEZIA: 0

## 2023-05-02 ENCOUNTER — OFFICE VISIT (OUTPATIENT)
Dept: INTERNAL MEDICINE | Facility: CLINIC | Age: 28
End: 2023-05-02
Payer: COMMERCIAL

## 2023-05-02 VITALS
TEMPERATURE: 98.1 F | OXYGEN SATURATION: 99 % | RESPIRATION RATE: 16 BRPM | DIASTOLIC BLOOD PRESSURE: 80 MMHG | BODY MASS INDEX: 26.77 KG/M2 | HEART RATE: 76 BPM | HEIGHT: 73 IN | WEIGHT: 202 LBS | SYSTOLIC BLOOD PRESSURE: 104 MMHG

## 2023-05-02 DIAGNOSIS — Z00.00 ROUTINE GENERAL MEDICAL EXAMINATION AT A HEALTH CARE FACILITY: Primary | ICD-10-CM

## 2023-05-02 LAB
ALBUMIN SERPL BCG-MCNC: 4.8 G/DL (ref 3.5–5.2)
ALP SERPL-CCNC: 57 U/L (ref 40–129)
ALT SERPL W P-5'-P-CCNC: 23 U/L (ref 10–50)
ANION GAP SERPL CALCULATED.3IONS-SCNC: 10 MMOL/L (ref 7–15)
AST SERPL W P-5'-P-CCNC: 28 U/L (ref 10–50)
BILIRUB SERPL-MCNC: 0.3 MG/DL
BUN SERPL-MCNC: 11.9 MG/DL (ref 6–20)
CALCIUM SERPL-MCNC: 9.9 MG/DL (ref 8.6–10)
CHLORIDE SERPL-SCNC: 103 MMOL/L (ref 98–107)
CHOLEST SERPL-MCNC: 171 MG/DL
CREAT SERPL-MCNC: 0.95 MG/DL (ref 0.67–1.17)
DEPRECATED HCO3 PLAS-SCNC: 27 MMOL/L (ref 22–29)
ERYTHROCYTE [DISTWIDTH] IN BLOOD BY AUTOMATED COUNT: 11.7 % (ref 10–15)
GFR SERPL CREATININE-BSD FRML MDRD: >90 ML/MIN/1.73M2
GLUCOSE SERPL-MCNC: 94 MG/DL (ref 70–99)
HCT VFR BLD AUTO: 43.5 % (ref 40–53)
HDLC SERPL-MCNC: 61 MG/DL
HGB BLD-MCNC: 14.7 G/DL (ref 13.3–17.7)
LDLC SERPL CALC-MCNC: 102 MG/DL
MCH RBC QN AUTO: 32 PG (ref 26.5–33)
MCHC RBC AUTO-ENTMCNC: 33.8 G/DL (ref 31.5–36.5)
MCV RBC AUTO: 95 FL (ref 78–100)
NONHDLC SERPL-MCNC: 110 MG/DL
PLATELET # BLD AUTO: 232 10E3/UL (ref 150–450)
POTASSIUM SERPL-SCNC: 4.6 MMOL/L (ref 3.4–5.3)
PROT SERPL-MCNC: 7.7 G/DL (ref 6.4–8.3)
RBC # BLD AUTO: 4.59 10E6/UL (ref 4.4–5.9)
SODIUM SERPL-SCNC: 140 MMOL/L (ref 136–145)
TRIGL SERPL-MCNC: 40 MG/DL
WBC # BLD AUTO: 3.5 10E3/UL (ref 4–11)

## 2023-05-02 PROCEDURE — 99395 PREV VISIT EST AGE 18-39: CPT | Performed by: INTERNAL MEDICINE

## 2023-05-02 PROCEDURE — 36415 COLL VENOUS BLD VENIPUNCTURE: CPT | Performed by: INTERNAL MEDICINE

## 2023-05-02 PROCEDURE — 85027 COMPLETE CBC AUTOMATED: CPT | Performed by: INTERNAL MEDICINE

## 2023-05-02 PROCEDURE — 80053 COMPREHEN METABOLIC PANEL: CPT | Performed by: INTERNAL MEDICINE

## 2023-05-02 PROCEDURE — 80061 LIPID PANEL: CPT | Performed by: INTERNAL MEDICINE

## 2023-05-02 ASSESSMENT — ENCOUNTER SYMPTOMS
HEMATURIA: 0
ABDOMINAL PAIN: 0
COUGH: 0
HEADACHES: 0
FEVER: 0
DIZZINESS: 0
PALPITATIONS: 0
SHORTNESS OF BREATH: 0
SORE THROAT: 0
HEARTBURN: 0
WEAKNESS: 0
DIARRHEA: 0
HEMATOCHEZIA: 0
NERVOUS/ANXIOUS: 0
NAUSEA: 0
ARTHRALGIAS: 0
JOINT SWELLING: 0
PARESTHESIAS: 0
MYALGIAS: 0
EYE PAIN: 0
CONSTIPATION: 0
DYSURIA: 0
CHILLS: 0
FREQUENCY: 0

## 2023-05-02 NOTE — PROGRESS NOTES
SUBJECTIVE:   CC: Foreign is an 28 year old who presents for preventative health visit.       5/2/2023     7:02 AM   Additional Questions   Roomed by Paty SIMPSON   Patient has been advised of split billing requirements and indicates understanding: Yes  Healthy Habits:     Getting at least 3 servings of Calcium per day:  Yes    Bi-annual eye exam:  Yes    Dental care twice a year:  Yes    Sleep apnea or symptoms of sleep apnea:  None    Diet:  Regular (no restrictions)    Frequency of exercise:  2-3 days/week    Duration of exercise:  45-60 minutes    Taking medications regularly:  Yes    Medication side effects:  Not applicable    PHQ-2 Total Score: 0    Additional concerns today:  No        Today's PHQ-2 Score:       4/29/2023    12:50 PM   PHQ-2 ( 1999 Pf[employer])   Q1: Little interest or pleasure in doing things 0   Q2: Feeling down, depressed or hopeless 0   PHQ-2 Score 0   Q1: Little interest or pleasure in doing things Not at all   Q2: Feeling down, depressed or hopeless Not at all   PHQ-2 Score 0           Social History     Tobacco Use     Smoking status: Never     Smokeless tobacco: Current     Types: Chew   Vaping Use     Vaping status: Never Used   Substance Use Topics     Alcohol use: Yes           4/29/2023    12:49 PM   Alcohol Use   Prescreen: >3 drinks/day or >7 drinks/week? No       Last PSA: No results found for: PSA    Reviewed orders with patient. Reviewed health maintenance and updated orders accordingly - Yes  Lab work is in process    Reviewed and updated as needed this visit by clinical staff   Tobacco  Allergies  Meds              Reviewed and updated as needed this visit by Provider                     Review of Systems   Constitutional: Negative for chills and fever.   HENT: Negative for congestion, ear pain, hearing loss and sore throat.    Eyes: Negative for pain and visual disturbance.   Respiratory: Negative for cough and shortness of breath.    Cardiovascular: Negative for chest pain,  "palpitations and peripheral edema.   Gastrointestinal: Negative for abdominal pain, constipation, diarrhea, heartburn, hematochezia and nausea.   Genitourinary: Negative for dysuria, frequency, genital sores, hematuria, impotence, penile discharge and urgency.   Musculoskeletal: Negative for arthralgias, joint swelling and myalgias.   Skin: Negative for rash.   Neurological: Negative for dizziness, weakness, headaches and paresthesias.   Psychiatric/Behavioral: Negative for mood changes. The patient is not nervous/anxious.        OBJECTIVE:   /80 (BP Location: Right arm, Patient Position: Sitting)   Pulse 76   Temp 98.1  F (36.7  C)   Resp 16   Ht 1.86 m (6' 1.23\")   Wt 91.6 kg (202 lb)   SpO2 99%   BMI 26.48 kg/m      Physical Exam    General: Alert, in no distress  Skin: No significant lesion seen.  Eyes/nose/throat: Eyes without scleral icterus, eye movements normal, pupils equal and reactive, oropharynx clear  + Bilateral earwax.  Recommended that he use over-the-counter wax dissolving eardrop, example brand Debrox or Murine  MSK: Neck with good ROM  Lymphatic: Neck without adenopathy or masses  Endocrine: Thyroid with no nodules to palpation  Pulm: Lungs clear to auscultation bilaterally  Cardiac: Heart with regular rate and rhythm, no murmur or gallop  GI: Abdomen soft, nontender. No palpable enlargement of liver or spleen  MSK: Extremities no tenderness or edema  Neuro: Moves all extremities, without focal weakness  Psych: Alert, normal mental status. 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  He has excellent habits (except a little chewing tobacco)     He is fasting this afternoon, so we will get a comprehensive metabolic panel, blood cell counts, and lipid " profile.     Issues are as follows:     History COVID infection March 2022, no long term effects    History of lumbar decompression surgery 2014 because of herniated disc, asymptomatic as of 2022  Nowadays enjoys yoga     Back Pain (Injured back in 2014, had a herniated disc.)  12/19/2014 DECOMPRESSION LEFT L4-5  [facility]   POSTOPERATIVE DIAGNOSIS   1. Left leg pain in the 5th distribution secondary to #2.   2. Left L4-L5, central left parasagittal disk herniation with caudal extrusion causing impingement of the traversing left 5th root.   3. Failure of conservative measures.     PROCEDURES PERFORMED  1. Left L4 inferior hemilaminotomy.   2. Left L5 superior hemilaminotomy.   3. Left L4-L5 medial facetectomy, subarticular decompression of the traversing left 5th root.   4. Left L4-L5 partial diskectomy further central and subarticular decompression of the traversing neural anatomy in particular to the left L5 root.      History of bilateral posterior cruciate ligament arthroscopic knee surgery done when he was in high school.  He got a good result, which enabled him to play college football.     Bilateral earwax.  Recommended that he use over-the-counter wax dissolving eardrop, example brand Debrox or Murine      Uses some chewing tobacco  I reminded him that chewing tobacco would still trigger screening cotinine tests in blood or urine, as would be done during insurance exams.     Good BMI 26.5 given he is muscular  Wt Readings from Last 5 Encounters:   05/02/23 91.6 kg (202 lb)   02/04/23 95.7 kg (211 lb)   11/07/22 98.4 kg (217 lb)   05/11/22 99.5 kg (219 lb 4.8 oz)   07/22/20 95.7 kg (211 lb)      Family to colon polyps (father), and second-degree relative who had colon cancer (paternal grandmother)  At age 28, there would not be any screening that he needs to worry about right now.  Perhaps at age 41, he could start colon screening, and the modalities available by the time he reaches age 41  "might be more convenient than what we have today, which is stool testing and colonoscopy     Received usual childhood vaccines.  Consider COVID vaccination when the next generation vaccines available     Immunization History   Administered Date(s) Administered     DTAP (<7y) 09/08/1997, 04/04/2000     DTP-Hib 1996, 1996, 1996     FLU 6-35 months 11/04/2005     HEPATITIS A (PEDS 12M-18Y) 06/22/2012, 02/18/2013     HIB (PRP-T) 03/06/1997     HPV Quadrivalent 06/22/2012, 09/24/2012, 02/18/2013     Hepatits B (Peds <19Y) 1996, 1996, 1996     Influenza Vaccine >6 months (Alfuria,Fluzone) 01/04/2019     MMR 03/06/1997, 04/02/2001     Meningococcal ACWY (Menactra ) 04/10/2010     Meningococcal ACWY (Menveo ) 07/11/2014     Meningococcal B (Bexsero ) 08/08/2017     Poliovirus, inactivated (IPV) 1996, 1996, 09/08/1997, 04/04/2000     TDAP Vaccine (Adacel) 04/02/2007, 09/24/2012     Td (Adult), Adsorbed 07/29/2018     Varicella 09/08/1997, 04/02/2007         COUNSELING:   Reviewed preventive health counseling, as reflected in patient instructions       Healthy diet/nutrition      BMI:   Estimated body mass index is 26.48 kg/m  as calculated from the following:    Height as of this encounter: 1.86 m (6' 1.23\").    Weight as of this encounter: 91.6 kg (202 lb).         He reports that he has never smoked. His smokeless tobacco use includes chew.      FERNANDO WAITE MD  [facility]  "

## 2023-05-02 NOTE — PATIENT INSTRUCTIONS
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  He has excellent habits (except a little chewing tobacco)     He is fasting this afternoon, so we will get a comprehensive metabolic panel, blood cell counts, and lipid profile.     Issues are as follows:     History COVID infection March 2022, no long term effects    History of lumbar decompression surgery 2014 because of herniated disc, asymptomatic as of 2022  Nowadays enjoys yoga     Back Pain (Injured back in 2014, had a herniated disc.)  12/19/2014 DECOMPRESSION LEFT L4-5  [facility]   POSTOPERATIVE DIAGNOSIS   1. Left leg pain in the 5th distribution secondary to #2.   2. Left L4-L5, central left parasagittal disk herniation with caudal extrusion causing impingement of the traversing left 5th root.   3. Failure of conservative measures.     PROCEDURES PERFORMED  1. Left L4 inferior hemilaminotomy.   2. Left L5 superior hemilaminotomy.   3. Left L4-L5 medial facetectomy, subarticular decompression of the traversing left 5th root.   4. Left L4-L5 partial diskectomy further central and subarticular decompression of the traversing neural anatomy in particular to the left L5 root.      History of bilateral posterior cruciate ligament arthroscopic knee surgery done when he was in high school.  He got a good result, which enabled him to play college football.     Bilateral earwax.  Recommended that he use over-the-counter wax dissolving eardrop, example brand Debrox or Murine      Uses some chewing tobacco  I reminded him that chewing tobacco would still trigger screening cotinine tests in blood or urine, as would be done during insurance exams.     Good BMI 26.5 given he is muscular  Wt Readings from Last 5 Encounters:   05/02/23 91.6 kg (202 lb)   02/04/23 95.7 kg (211 lb)    11/07/22 98.4 kg (217 lb)   05/11/22 99.5 kg (219 lb 4.8 oz)   07/22/20 95.7 kg (211 lb)      Family to colon polyps (father), and second-degree relative who had colon cancer (paternal grandmother)  At age 28, there would not be any screening that he needs to worry about right now.  Perhaps at age 41, he could start colon screening, and the modalities available by the time he reaches age 40 might be more convenient than what we have today, which is stool testing and colonoscopy     Received usual childhood vaccines  Consider COVID vaccination when the next generation vaccines available     Immunization History   Administered Date(s) Administered    DTAP (<7y) 09/08/1997, 04/04/2000    DTP-Hib 1996, 1996, 1996    FLU 6-35 months 11/04/2005    HEPATITIS A (PEDS 12M-18Y) 06/22/2012, 02/18/2013    HIB (PRP-T) 03/06/1997    HPV Quadrivalent 06/22/2012, 09/24/2012, 02/18/2013    Hepatits B (Peds <19Y) 1996, 1996, 1996    Influenza Vaccine >6 months (Alfuria,Fluzone) 01/04/2019    MMR 03/06/1997, 04/02/2001    Meningococcal ACWY (Menactra ) 04/10/2010    Meningococcal ACWY (Menveo ) 07/11/2014    Meningococcal B (Bexsero ) 08/08/2017    Poliovirus, inactivated (IPV) 1996, 1996, 09/08/1997, 04/04/2000    TDAP Vaccine (Adacel) 04/02/2007, 09/24/2012    Td (Adult), Adsorbed 07/29/2018    Varicella 09/08/1997, 04/02/2007

## 2023-09-17 ENCOUNTER — E-VISIT (OUTPATIENT)
Dept: INTERNAL MEDICINE | Facility: CLINIC | Age: 28
End: 2023-09-17
Payer: COMMERCIAL

## 2023-09-17 DIAGNOSIS — R22.40: Primary | ICD-10-CM

## 2023-09-17 PROCEDURE — 99207 PR NON-BILLABLE SERV PER CHARTING: CPT | Performed by: INTERNAL MEDICINE

## 2023-09-26 ENCOUNTER — OFFICE VISIT (OUTPATIENT)
Dept: INTERNAL MEDICINE | Facility: CLINIC | Age: 28
End: 2023-09-26

## 2023-09-26 VITALS
SYSTOLIC BLOOD PRESSURE: 120 MMHG | DIASTOLIC BLOOD PRESSURE: 78 MMHG | OXYGEN SATURATION: 99 % | TEMPERATURE: 98.6 F | WEIGHT: 207 LBS | HEIGHT: 73 IN | RESPIRATION RATE: 16 BRPM | BODY MASS INDEX: 27.43 KG/M2 | HEART RATE: 73 BPM

## 2023-09-26 DIAGNOSIS — L72.3 SEBACEOUS CYST: Primary | ICD-10-CM

## 2023-09-26 PROCEDURE — 99213 OFFICE O/P EST LOW 20 MIN: CPT | Performed by: INTERNAL MEDICINE

## 2023-09-26 NOTE — PATIENT INSTRUCTIONS
Acute symptom visit    historically very healthy 28-year-old man, who works in commercial real estate, and played football center at [employer]    Foreign comes in today as a follow-up after an electronic message exchange we had last week on September 17 when he reported a lump in his left proximal thigh, near the perineum, which I believe is a    Inclusion or sebaceous cyst, does not seem infected, and is about 1 cm deep, with a dimension of approximately 1.5 x 2 cm, left proximal medial thigh, near the perineum    Foreign first noticed this lump about a week ago, and he says has been getting a little smaller since then.  No pain, no fever or systemic symptoms.    There is a slight discoloration over this area, but I do not detect redness or signs of infection.  Its not particularly tender.  The soft tissue lump is rounded and movable, about 1.5 x 2 cm, subcutaneous.    I told Foreign that the cyst probably originated from a blocked oil gland centered on a hair follicle, which formed a bubble of sebaceous oily material beneath the skin.  At the moment it does not seem infected, but infection is a potential complication.    I told Foreign that his body may simply wall off this area and it may gradually resolve over weeks to months.  It may leave behind some thickening that will be palpable under the skin even long-term.  If that becomes the case, no intervention is needed.    But intervention would be needed if there are signs of infection such as swelling, redness, drainage, or fever or chills.  If such were to occur, he needs to notify me promptly, because I would put him on antibiotics and send him to the general surgery clinic for incision and drainage, and also evacuation of the excision of the cyst cavity.    Because of the location, I told Foreign that he should avoid riding a bicycle, because a bicycle seat would press directly on this area.    Pulling his longer term issues:    History COVID  infection March 2022, no long term effects     History of lumbar decompression surgery 2014 because of herniated disc, asymptomatic as of 2022  Nowadays enjoys yoga    Back Pain (Injured back in 2014, had a herniated disc.)  12/19/2014 DECOMPRESSION LEFT L4-5  [facility]   POSTOPERATIVE DIAGNOSIS   1. Left leg pain in the 5th distribution secondary to #2.   2. Left L4-L5, central left parasagittal disk herniation with caudal extrusion causing impingement of the traversing left 5th root.   3. Failure of conservative measures.     PROCEDURES PERFORMED  1. Left L4 inferior hemilaminotomy.   2. Left L5 superior hemilaminotomy.   3. Left L4-L5 medial facetectomy, subarticular decompression of the traversing left 5th root.   4. Left L4-L5 partial diskectomy further central and subarticular decompression of the traversing neural anatomy in particular to the left L5 root.       History of bilateral posterior cruciate ligament arthroscopic knee surgery done when he was in high school.  He got a good result, which enabled him to play college football.     Bilateral earwax.  Recommended that he use over-the-counter wax dissolving eardrop, example brand Debrox or Murine      Uses some chewing tobacco  I reminded him that chewing tobacco would still trigger screening cotinine tests in blood or urine, as would be done during insurance exams.     Good BMI 26.5 given he is muscular  Wt Readings from Last 5 Encounters:   09/26/23 93.9 kg (207 lb)   05/02/23 91.6 kg (202 lb)   02/04/23 95.7 kg (211 lb)   11/07/22 98.4 kg (217 lb)   05/11/22 99.5 kg (219 lb 4.8 oz)     Family to colon polyps (father), and second-degree relative who had colon cancer (paternal grandmother)  At age 28, there would not be any screening that he needs to worry about right now.  Perhaps at age 41, he could start colon screening, and the modalities available by the time he reaches age 41 might be more convenient than what we have today,  which is stool testing and colonoscopy     Received usual childhood vaccines.  Consider COVID vaccination when the next generation vaccines available

## 2023-09-26 NOTE — PROGRESS NOTES
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  No pain, no fever or systemic symptoms.    There is a slight discoloration over this area, but I do not detect redness or signs of infection.  Its not particularly tender.  The soft tissue lump is rounded and movable, about 1.5 x 2 cm, subcutaneous.    I told Foreign that the cyst probably originated from a blocked oil gland centered on a hair follicle, which formed a bubble of sebaceous oily material beneath the skin.  At the moment it does not seem infected, but infection is a potential complication.    I told Foreign that his body may simply wall off this area and it may gradually resolve over weeks to months.  It may leave behind some thickening that will be palpable under the skin even long-term.  If that becomes the case, no intervention is needed.    But intervention would be needed if there are signs of infection such as swelling, redness, drainage, or fever or chills.  If such were to occur, he needs to notify me promptly, because I would put him on antibiotics and send  him to the general surgery clinic for incision and drainage, and also evacuation of the excision of the cyst cavity.    Because of the location, I told Foreign that he should avoid riding a bicycle, because a bicycle seat would press directly on this area.    Pulling his longer term issues:    History COVID infection March 2022, no long term effects     History of lumbar decompression surgery 2014 because of herniated disc, asymptomatic as of 2022  Nowadays enjoys yoga    Back Pain (Injured back in 2014, had a herniated disc.)  12/19/2014 DECOMPRESSION LEFT L4-5  [facility]   POSTOPERATIVE DIAGNOSIS   1. Left leg pain in the 5th distribution secondary to #2.   2. Left L4-L5, central left parasagittal disk herniation with caudal extrusion causing impingement of the traversing left 5th root.   3. Failure of conservative measures.     PROCEDURES PERFORMED  1. Left L4 inferior hemilaminotomy.   2. Left L5 superior hemilaminotomy.   3. Left L4-L5 medial facetectomy, subarticular decompression of the traversing left 5th root.   4. Left L4-L5 partial diskectomy further central and subarticular decompression of the traversing neural anatomy in particular to the left L5 root.       History of bilateral posterior cruciate ligament arthroscopic knee surgery done when he was in high school.  He got a good result, which enabled him to play college football.     Bilateral earwax.  Recommended that he use over-the-counter wax dissolving eardrop, example brand Debrox or Murine      Uses some chewing tobacco  I reminded him that chewing tobacco would still trigger screening cotinine tests in blood or urine, as would be done during insurance exams.     Good BMI 26.5 given he is muscular  Wt Readings from Last 5 Encounters:   09/26/23 93.9 kg (207 lb)   05/02/23 91.6 kg (202 lb)   02/04/23 95.7 kg (211 lb)   11/07/22 98.4 kg (217 lb)   05/11/22 99.5 kg (219 lb 4.8 oz)     Family to colon polyps (father), and  second-degree relative who had colon cancer (paternal grandmother)  At age 28, there would not be any screening that he needs to worry about right now.  Perhaps at age 41, he could start colon screening, and the modalities available by the time he reaches age 41 might be more convenient than what we have today, which is stool testing and colonoscopy     Received usual childhood vaccines.  Consider COVID vaccination when the next generation vaccines available      --------------------------------------------------------------------------------------------------------------------------  History of Present Illness  This 28 year old old     Foreign comes in today as a follow-up after an electronic message exchange we had last week on September 17 when he reported a lump in his left proximal thigh, near the perineum      Wt Readings from Last 3 Encounters:   09/26/23 93.9 kg (207 lb)   05/02/23 91.6 kg (202 lb)   02/04/23 95.7 kg (211 lb)     BP Readings from Last 3 Encounters:   09/26/23 120/78   05/02/23 104/80   02/04/23 124/79       Lab Results   Component Value Date    WBC 3.5 (L) 05/02/2023    HGB 14.7 05/02/2023    HCT 43.5 05/02/2023     05/02/2023    CHOL 171 05/02/2023    TRIG 40 05/02/2023    HDL 61 05/02/2023    ALT 23 05/02/2023    AST 28 05/02/2023     05/02/2023    BUN 11.9 05/02/2023    CO2 27 05/02/2023        Review of Systems: A comprehensive review of systems was negative except as noted.  ---------------------------------------------------------------------------------------------------------------------------    Medications, Allergies, Social, and Problem List     Current Outpatient Medications   Medication Sig Dispense Refill    multivitamin w/minerals (THERA-VIT-M) tablet Take 1 tablet by mouth daily       Allergies   Allergen Reactions    Codeine      Other reaction(s): Hallucinations  Combative, aggressive    Sulfa Antibiotics      Other reaction(s): *Unknown    Penicillins Rash  "    Social History     Tobacco Use    Smoking status: Never    Smokeless tobacco: Current     Types: Chew   Vaping Use    Vaping Use: Never used   Substance Use Topics    Alcohol use: Yes    Drug use: No     Patient Active Problem List   Diagnosis    History of COVID-19-- March 2022        Reviewed, reconciled and updated       Physical Exam   General Appearance:       /78 (BP Location: Right arm, Patient Position: Sitting, Cuff Size: Adult Regular)   Pulse 73   Temp 98.6  F (37  C)   Resp 16   Ht 1.861 m (6' 1.25\")   Wt 93.9 kg (207 lb)   SpO2 99%   BMI 27.12 kg/m      sebaceous cyst, does not seem infected, and is about 1 cm deep, with a dimension of approximately 1.5 x 2 cm, left proximal medial thigh, near the perineum  There is a slight discoloration over this area, but I do not detect redness or signs of infection.  Its not particularly tender.  The soft tissue lump is rounded and movable, about 1.5 x 2 cm, subcutaneous.     Additional Information        FERNANDO WAITE MD, MD        Answers submitted by the patient for this visit:  General Questionnaire (Submitted on 9/26/2023)  Chief Complaint: Chronic problems general questions HPI Form  How many servings of fruits and vegetables do you eat daily?: 2-3  On average, how many sweetened beverages do you drink each day (Examples: soda, juice, sweet tea, etc.  Do NOT count diet or artificially sweetened beverages)?: 0  How many minutes a day do you exercise enough to make your heart beat faster?: 30 to 60  How many days a week do you exercise enough to make your heart beat faster?: 4  How many days per week do you miss taking your medication?: 0  General Concern (Submitted on 9/26/2023)  Chief Complaint: Chronic problems general questions HPI Form  What is the reason for your visit today?: new lump discovered  When did your symptoms begin?: 1-2 weeks ago  What are your symptoms?: lump  How would you describe these symptoms?: Mild  Are your " symptoms:: Staying the same  Have you had these symptoms before?: No

## 2024-02-15 ENCOUNTER — OFFICE VISIT (OUTPATIENT)
Dept: URGENT CARE | Facility: URGENT CARE | Age: 29
End: 2024-02-15
Payer: COMMERCIAL

## 2024-02-15 VITALS
SYSTOLIC BLOOD PRESSURE: 133 MMHG | HEART RATE: 93 BPM | OXYGEN SATURATION: 100 % | TEMPERATURE: 102.2 F | DIASTOLIC BLOOD PRESSURE: 73 MMHG

## 2024-02-15 DIAGNOSIS — J10.1 INFLUENZA A: ICD-10-CM

## 2024-02-15 DIAGNOSIS — R68.89 FLU-LIKE SYMPTOMS: Primary | ICD-10-CM

## 2024-02-15 LAB
DEPRECATED S PYO AG THROAT QL EIA: NEGATIVE
FLUAV AG SPEC QL IA: POSITIVE
FLUBV AG SPEC QL IA: NEGATIVE
GROUP A STREP BY PCR: NOT DETECTED

## 2024-02-15 PROCEDURE — 87651 STREP A DNA AMP PROBE: CPT | Performed by: PHYSICIAN ASSISTANT

## 2024-02-15 PROCEDURE — 87635 SARS-COV-2 COVID-19 AMP PRB: CPT | Performed by: PHYSICIAN ASSISTANT

## 2024-02-15 PROCEDURE — 99214 OFFICE O/P EST MOD 30 MIN: CPT | Performed by: PHYSICIAN ASSISTANT

## 2024-02-15 PROCEDURE — 87804 INFLUENZA ASSAY W/OPTIC: CPT | Performed by: PHYSICIAN ASSISTANT

## 2024-02-15 RX ORDER — IBUPROFEN 800 MG/1
800 TABLET, FILM COATED ORAL EVERY 8 HOURS PRN
Qty: 30 TABLET | Refills: 0 | Status: SHIPPED | OUTPATIENT
Start: 2024-02-15

## 2024-02-15 RX ORDER — BENZONATATE 200 MG/1
200 CAPSULE ORAL 3 TIMES DAILY PRN
Qty: 30 CAPSULE | Refills: 0 | Status: SHIPPED | OUTPATIENT
Start: 2024-02-15

## 2024-02-15 ASSESSMENT — ENCOUNTER SYMPTOMS
FEVER: 1
RHINORRHEA: 1
FATIGUE: 1
HEADACHES: 1
MYALGIAS: 1
SORE THROAT: 1
COUGH: 1

## 2024-02-15 NOTE — PROGRESS NOTES
SUBJECTIVE:   Foreign Niño is a 28 year old male presenting with a chief complaint of   Chief Complaint   Patient presents with    URI     - 2 Days   - Fever (Above the 100s since last night)  - Chills  - Sore Throat  - Cough  - Cold Sweat  - OTC Cold Medication for symptoms        He is an established patient of [facility].    Treatment:  hydration; cough drops, vitamin.    Review of Systems   Constitutional:  Positive for fatigue and fever.   HENT:  Positive for rhinorrhea and sore throat. Negative for congestion and ear pain.    Respiratory:  Positive for cough.    Musculoskeletal:  Positive for myalgias.   Neurological:  Positive for headaches.   All other systems reviewed and are negative.      No past medical history on file.  Family History   Problem Relation Age of Onset    Family History Negative Mother     Colon Polyps Father 36    Family History Negative Maternal Grandmother 78    Family History Negative Maternal Grandfather     Colon Cancer Paternal Grandmother 49        49  from colon cancer    Family History Negative Brother      Current Outpatient Medications   Medication Sig Dispense Refill    multivitamin w/minerals (THERA-VIT-M) tablet Take 1 tablet by mouth daily       Social History     Tobacco Use    Smoking status: Never    Smokeless tobacco: Current     Types: Chew   Substance Use Topics    Alcohol use: Yes       OBJECTIVE  /73   Pulse 93   Temp (!) 102.2  F (39  C) (Tympanic)   SpO2 100%     Physical Exam  Vitals and nursing note reviewed.   Constitutional:       Appearance: Normal appearance. He is normal weight.   HENT:      Head: Normocephalic and atraumatic.      Right Ear: Tympanic membrane, ear canal and external ear normal.      Left Ear: Tympanic membrane, ear canal and external ear normal.      Nose: Nose normal.      Mouth/Throat:      Mouth: Mucous membranes are moist.      Pharynx: Oropharynx is clear. Posterior oropharyngeal erythema present.   Eyes:       Extraocular Movements: Extraocular movements intact.      Conjunctiva/sclera: Conjunctivae normal.   Cardiovascular:      Rate and Rhythm: Normal rate and regular rhythm.      Pulses: Normal pulses.      Heart sounds: Normal heart sounds.   Pulmonary:      Effort: Pulmonary effort is normal.      Breath sounds: Normal breath sounds.   Musculoskeletal:      Cervical back: Normal range of motion and neck supple. No rigidity. No muscular tenderness.   Skin:     General: Skin is warm and dry.   Neurological:      General: No focal deficit present.      Mental Status: He is alert.   Psychiatric:         Mood and Affect: Mood normal.         Behavior: Behavior normal.         Labs:  Results for orders placed or performed in visit on 02/15/24 (from the past 24 hour(s))   Streptococcus A Rapid Screen w/Reflex to PCR - Clinic Collect    Specimen: Throat; Swab   Result Value Ref Range    Group A Strep antigen Negative Negative   Influenza A/B antigen    Specimen: Nose; Swab   Result Value Ref Range    Influenza A antigen Positive (A) Negative    Influenza B antigen Negative Negative    Narrative    Test results must be correlated with clinical data. If necessary, results should be confirmed by a molecular assay or viral culture.         ASSESSMENT:      ICD-10-CM    1. Flu-like symptoms  R68.89 Streptococcus A Rapid Screen w/Reflex to PCR - Clinic Collect     Influenza A/B antigen     Symptomatic COVID-19 Virus (Coronavirus) by PCR Nose     Group A Streptococcus PCR Throat Swab      2. Influenza A  J10.1            Medical Decision Making:    Differential Diagnosis:  URI Adult/Peds:  Influenza, Strep pharyngitis, Viral pharyngitis, Viral upper respiratory illness, and covid      Serious Comorbid Conditions:  Adult:   reviewed    PLAN:    Tylenol/motrin as needed.  Drink plenty of water.  Gargle with salt water.  May use chloraseptic spray as directed for ST.  Strep pcr.  Discussed and recommended CDC guidelines for self  isolation.  COVID test pending.    Patient declined tamiflu.  Rx for ibuprofen and tessalon perles.  Discussed expected course.        Followup:    If not improving or if condition worsens, follow up with your Primary Care Provider, If not improving or if conditions worsens over the next 12-24 hours, go to the Emergency Department    There are no Patient Instructions on file for this visit.

## 2024-02-16 LAB — SARS-COV-2 RNA RESP QL NAA+PROBE: NEGATIVE

## 2024-07-14 ENCOUNTER — HEALTH MAINTENANCE LETTER (OUTPATIENT)
Age: 29
End: 2024-07-14

## 2024-11-07 ENCOUNTER — OFFICE VISIT (OUTPATIENT)
Dept: INTERNAL MEDICINE | Facility: CLINIC | Age: 29
End: 2024-11-07
Payer: COMMERCIAL

## 2024-11-07 VITALS
BODY MASS INDEX: 26.5 KG/M2 | TEMPERATURE: 97.1 F | HEIGHT: 74 IN | SYSTOLIC BLOOD PRESSURE: 100 MMHG | OXYGEN SATURATION: 99 % | HEART RATE: 54 BPM | DIASTOLIC BLOOD PRESSURE: 72 MMHG | WEIGHT: 206.5 LBS | RESPIRATION RATE: 16 BRPM

## 2024-11-07 DIAGNOSIS — Z00.00 ROUTINE GENERAL MEDICAL EXAMINATION AT A HEALTH CARE FACILITY: Primary | ICD-10-CM

## 2024-11-07 LAB
ALBUMIN SERPL BCG-MCNC: 4.6 G/DL (ref 3.5–5.2)
ALP SERPL-CCNC: 49 U/L (ref 40–150)
ALT SERPL W P-5'-P-CCNC: 23 U/L (ref 0–70)
ANION GAP SERPL CALCULATED.3IONS-SCNC: 8 MMOL/L (ref 7–15)
AST SERPL W P-5'-P-CCNC: 27 U/L (ref 0–45)
BASOPHILS # BLD AUTO: 0 10E3/UL (ref 0–0.2)
BASOPHILS NFR BLD AUTO: 1 %
BILIRUB SERPL-MCNC: 0.4 MG/DL
BUN SERPL-MCNC: 11.6 MG/DL (ref 6–20)
CALCIUM SERPL-MCNC: 9.6 MG/DL (ref 8.8–10.4)
CHLORIDE SERPL-SCNC: 103 MMOL/L (ref 98–107)
CHOLEST SERPL-MCNC: 187 MG/DL
CREAT SERPL-MCNC: 0.97 MG/DL (ref 0.67–1.17)
EGFRCR SERPLBLD CKD-EPI 2021: >90 ML/MIN/1.73M2
EOSINOPHIL # BLD AUTO: 0.1 10E3/UL (ref 0–0.7)
EOSINOPHIL NFR BLD AUTO: 1 %
ERYTHROCYTE [DISTWIDTH] IN BLOOD BY AUTOMATED COUNT: 11.5 % (ref 10–15)
FASTING STATUS PATIENT QL REPORTED: YES
FASTING STATUS PATIENT QL REPORTED: YES
GLUCOSE SERPL-MCNC: 89 MG/DL (ref 70–99)
HCO3 SERPL-SCNC: 27 MMOL/L (ref 22–29)
HCT VFR BLD AUTO: 41.1 % (ref 40–53)
HDLC SERPL-MCNC: 66 MG/DL
HGB BLD-MCNC: 14.2 G/DL (ref 13.3–17.7)
IMM GRANULOCYTES # BLD: 0 10E3/UL
IMM GRANULOCYTES NFR BLD: 0 %
LDLC SERPL CALC-MCNC: 113 MG/DL
LYMPHOCYTES # BLD AUTO: 1.4 10E3/UL (ref 0.8–5.3)
LYMPHOCYTES NFR BLD AUTO: 37 %
MCH RBC QN AUTO: 32.5 PG (ref 26.5–33)
MCHC RBC AUTO-ENTMCNC: 34.5 G/DL (ref 31.5–36.5)
MCV RBC AUTO: 94 FL (ref 78–100)
MONOCYTES # BLD AUTO: 0.4 10E3/UL (ref 0–1.3)
MONOCYTES NFR BLD AUTO: 10 %
NEUTROPHILS # BLD AUTO: 1.9 10E3/UL (ref 1.6–8.3)
NEUTROPHILS NFR BLD AUTO: 51 %
NONHDLC SERPL-MCNC: 121 MG/DL
PLATELET # BLD AUTO: 201 10E3/UL (ref 150–450)
POTASSIUM SERPL-SCNC: 4.6 MMOL/L (ref 3.4–5.3)
PROT SERPL-MCNC: 7.4 G/DL (ref 6.4–8.3)
RBC # BLD AUTO: 4.37 10E6/UL (ref 4.4–5.9)
SODIUM SERPL-SCNC: 138 MMOL/L (ref 135–145)
TRIGL SERPL-MCNC: 40 MG/DL
WBC # BLD AUTO: 3.7 10E3/UL (ref 4–11)
WBC # BLD AUTO: 3.7 10E3/UL (ref 4–11)

## 2024-11-07 PROCEDURE — 80053 COMPREHEN METABOLIC PANEL: CPT | Performed by: INTERNAL MEDICINE

## 2024-11-07 PROCEDURE — 36415 COLL VENOUS BLD VENIPUNCTURE: CPT | Performed by: INTERNAL MEDICINE

## 2024-11-07 PROCEDURE — 80061 LIPID PANEL: CPT | Performed by: INTERNAL MEDICINE

## 2024-11-07 PROCEDURE — 85025 COMPLETE CBC W/AUTO DIFF WBC: CPT | Performed by: INTERNAL MEDICINE

## 2024-11-07 PROCEDURE — 99395 PREV VISIT EST AGE 18-39: CPT | Performed by: INTERNAL MEDICINE

## 2024-11-07 SDOH — HEALTH STABILITY: PHYSICAL HEALTH: ON AVERAGE, HOW MANY DAYS PER WEEK DO YOU ENGAGE IN MODERATE TO STRENUOUS EXERCISE (LIKE A BRISK WALK)?: 4 DAYS

## 2024-11-07 SDOH — HEALTH STABILITY: PHYSICAL HEALTH: ON AVERAGE, HOW MANY MINUTES DO YOU ENGAGE IN EXERCISE AT THIS LEVEL?: 60 MIN

## 2024-11-07 ASSESSMENT — SOCIAL DETERMINANTS OF HEALTH (SDOH)
HOW OFTEN DO YOU GET TOGETHER WITH FRIENDS OR RELATIVES?: TWICE A WEEK
HOW OFTEN DO YOU GET TOGETHER WITH FRIENDS OR RELATIVES?: TWICE A WEEK

## 2024-11-07 NOTE — PATIENT INSTRUCTIONS
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     History of lumbar decompression surgery 2014 because of herniated disc, asymptomatic as of 2022  Nowadays enjoys yoga     Back Pain (Injured back in 2014, had a herniated disc.)  12/19/2014 DECOMPRESSION LEFT L4-5  [facility]   POSTOPERATIVE DIAGNOSIS   1. Left leg pain in the 5th distribution secondary to #2.   2. Left L4-L5, central left parasagittal disk herniation with caudal extrusion causing impingement of the traversing left 5th root.   3. Failure of conservative measures.     PROCEDURES PERFORMED  1. Left L4 inferior hemilaminotomy.   2. Left L5 superior hemilaminotomy.   3. Left L4-L5 medial facetectomy, subarticular decompression of the traversing left 5th root.   4. Left L4-L5 partial diskectomy further central and subarticular decompression of the traversing neural anatomy in particular to the left L5 root.       History of bilateral posterior cruciate ligament arthroscopic knee surgery done when he was in high school.  He got a good result, which enabled him to play college football.     Bilateral earwax.  Recommended that he use over-the-counter wax dissolving eardrop, example brand Debrox or Murine      Uses some chewing tobacco  I reminded him that chewing tobacco would still trigger screening cotinine tests in blood or urine, as would be done during insurance exams.     Good BMI 26.9 given he is muscular  Wt Readings from Last 5 Encounters:   11/07/24 93.7 kg (206 lb 8 oz)   09/26/23 93.9 kg (207 lb)   05/02/23 91.6 kg (202 lb)   02/04/23 95.7 kg (211 lb)   11/07/22 98.4 kg (217 lb)     Family to colon polyps (father), and second-degree relative who had colon cancer (paternal grandmother)  At  age 28, there would not be any screening that he needs to worry about right now.  Perhaps at age 41, he could start colon screening, and the modalities available by the time he reaches age 41 might be more convenient than what we have today, which is stool testing and colonoscopy    History COVID infection March 2022, no long term effects        Received usual childhood vaccines.  Consider COVID vaccination when the next generation vaccines available    Immunization History   Administered Date(s) Administered    DTAP (<7y) 09/08/1997, 04/04/2000    DTP-Hib 1996, 1996, 1996    HEPATITIS A (PEDS 12M-18Y) 06/22/2012, 02/18/2013    HIB (PRP-T) 03/06/1997    HPV Quadrivalent 06/22/2012, 09/24/2012, 02/18/2013    Hepatitis B, Peds 1996, 1996, 1996    Influenza Vaccine >6 months,quad, PF 01/04/2019    Influenza, seasonal, injectable, PF 11/04/2005    MMR 03/06/1997, 04/02/2001    Meningococcal ACWY (Menactra ) 04/10/2010    Meningococcal ACWY (Menveo ) 07/11/2014    Meningococcal B (Bexsero ) 08/08/2017    Poliovirus, inactivated (IPV) 1996, 1996, 09/08/1997, 04/04/2000    TDAP Vaccine (Adacel) 04/02/2007, 09/24/2012    Td (Adult), Adsorbed 07/29/2018    Varicella 09/08/1997, 04/02/2007

## 2024-11-07 NOTE — PROGRESS NOTES
Preventive Care Visit  [facility]  FERNANDO WAITE MD, Internal Medicine  Nov 7, 2024    Anjana Carrasquillo is a 29 year old, presenting for the following:  Physical (Physical)        11/7/2024     6:55 AM   Additional Questions   Roomed by Krissy GELLER      Health Care Directive  Patient does not have a Health Care Directive: Discussed advance care planning with patient; however, patient declined at this time.      11/7/2024   General Health   How would you rate your overall physical health? Good   Feel stress (tense, anxious, or unable to sleep) Only a little      (!) STRESS CONCERN      11/7/2024   Nutrition   Three or more servings of calcium each day? Yes   Diet: Regular (no restrictions)   How many servings of fruit and vegetables per day? (!) 2-3   How many sweetened beverages each day? 0-1            11/7/2024   Exercise   Days per week of moderate/strenous exercise 4 days   Average minutes spent exercising at this level 60 min            11/7/2024   Social Factors   Frequency of gathering with friends or relatives Twice a week   Worry food won't last until get money to buy more No   Food not last or not have enough money for food? No   Do you have housing? (Housing is defined as stable permanent housing and does not include staying ouside in a car, in a tent, in an abandoned building, in an overnight shelter, or couch-surfing.) Yes   Are you worried about losing your housing? No   Lack of transportation? No   Unable to get utilities (heat,electricity)? No            11/7/2024   Dental   Dentist two times every year? Yes            11/7/2024   TB Screening   Were you born outside of the [place]? No            Today's PHQ-2 Score:       11/7/2024     6:50 AM   PHQ-2 ( 1999 [employer])   Q1: Little interest or pleasure in doing things 0    Q2: Feeling down, depressed or hopeless 0    PHQ-2 Score 0    Q1: Little interest or pleasure in doing things Not at all   Q2: Feeling down, depressed or  "hopeless Not at all   PHQ-2 Score 0       Patient-reported           11/7/2024   Substance Use   Alcohol more than 3/day or more than 7/wk No   Do you use any other substances recreationally? (!) OTHER      Social History     Tobacco Use    Smoking status: Never    Smokeless tobacco: Current     Types: Chew   Vaping Use    Vaping status: Never Used   Substance Use Topics    Alcohol use: Yes    Drug use: No         11/7/2024   One time HIV Screening   Previous HIV test? No          11/7/2024   STI Screening   New sexual partner(s) since last STI/HIV test? No          11/7/2024   Contraception/Family Planning   Questions about contraception or family planning No      Reviewed and updated as needed this visit by Provider     Meds                  Review of Systems  Constitutional, HEENT, cardiovascular, pulmonary, gi and gu systems are negative, except as otherwise noted.     Objective    Exam  /72   Pulse 54   Temp 97.1  F (36.2  C) (Temporal)   Resp 16   Ht 1.867 m (6' 1.5\")   Wt 93.7 kg (206 lb 8 oz)   SpO2 99%   BMI 26.87 kg/m     Estimated body mass index is 26.87 kg/m  as calculated from the following:    Height as of this encounter: 1.867 m (6' 1.5\").    Weight as of this encounter: 93.7 kg (206 lb 8 oz).    Physical Exam    General: Alert, in no distress  Skin: No significant lesion seen.  Eyes/nose/throat: Eyes without scleral icterus, eye movements normal, pupils equal and reactive, oropharynx clear  + Bilateral earwax.  Recommended that he use over-the-counter wax dissolving eardrop, example brand Debrox or Murine  MSK: Neck with good ROM  Lymphatic: Neck without adenopathy or masses  Endocrine: Thyroid with no nodules to palpation  Pulm: Lungs clear to auscultation bilaterally  Cardiac: Heart with regular rate and rhythm, no murmur or gallop  GI: Abdomen soft, nontender. No palpable enlargement of liver or spleen  MSK: Extremities no tenderness or edema  Neuro: Moves all extremities, without " focal weakness  Psych: Alert, normal mental status. Normal affect and speech      ASSESSMENT/PLAN:      Annual preventive exam for this    historically very healthy 29-year-old man, who works in commercial real estate, and played football center at [email or web address]     He is fasting this morning, so we will get a comprehensive metabolic panel, blood cell counts, and lipid profile.     History of lumbar decompression surgery 2014 because of herniated disc, asymptomatic as of 2022  Nowadays enjoys yoga     Back Pain (Injured back in 2014, had a herniated disc.)  12/19/2014 DECOMPRESSION LEFT L4-5  [facility]   POSTOPERATIVE DIAGNOSIS   1. Left leg pain in the 5th distribution secondary to #2.   2. Left L4-L5, central left parasagittal disk herniation with caudal extrusion causing impingement of the traversing left 5th root.   3. Failure of conservative measures.     PROCEDURES PERFORMED  1. Left L4 inferior hemilaminotomy.   2. Left L5 superior hemilaminotomy.   3. Left L4-L5 medial facetectomy, subarticular decompression of the traversing left 5th root.   4. Left L4-L5 partial diskectomy further central and subarticular decompression of the traversing neural anatomy in particular to the left L5 root.       History of bilateral posterior cruciate ligament arthroscopic knee surgery done when he was in high school.  He got a good result, which enabled him to play college football.     Bilateral earwax.  Recommended that he use over-the-counter wax dissolving eardrop, example brand Debrox or Murine      Uses some chewing tobacco  I reminded him that chewing tobacco would still trigger screening cotinine tests in blood or urine, as would be done during insurance exams.     Good BMI 26.9 given he is muscular  Wt Readings from Last 5 Encounters:   11/07/24 93.7 kg (206 lb 8 oz)   09/26/23 93.9 kg (207 lb)   05/02/23 91.6 kg (202 lb)   02/04/23 95.7 kg (211 lb)   11/07/22 98.4 kg (217 lb)      Family to colon polyps (father), and second-degree relative who had colon cancer (paternal grandmother)  At age 28, there would not be any screening that he needs to worry about right now.  Perhaps at age 41, he could start colon screening, and the modalities available by the time he reaches age 41 might be more convenient than what we have today, which is stool testing and colonoscopy    History COVID infection March 2022, no long term effects        Received usual childhood vaccines.  Consider COVID vaccination when the next generation vaccines available    Immunization History   Administered Date(s) Administered    DTAP (<7y) 09/08/1997, 04/04/2000    DTP-Hib 1996, 1996, 1996    HEPATITIS A (PEDS 12M-18Y) 06/22/2012, 02/18/2013    HIB (PRP-T) 03/06/1997    HPV Quadrivalent 06/22/2012, 09/24/2012, 02/18/2013    Hepatitis B, Peds 1996, 1996, 1996    Influenza Vaccine >6 months,quad, PF 01/04/2019    Influenza, seasonal, injectable, PF 11/04/2005    MMR 03/06/1997, 04/02/2001    Meningococcal ACWY (Menactra ) 04/10/2010    Meningococcal ACWY (Menveo ) 07/11/2014    Meningococcal B (Bexsero ) 08/08/2017    Poliovirus, inactivated (IPV) 1996, 1996, 09/08/1997, 04/04/2000    TDAP Vaccine (Adacel) 04/02/2007, 09/24/2012    Td (Adult), Adsorbed 07/29/2018    Varicella 09/08/1997, 04/02/2007       Signed Electronically by: FERNANDO WAITE MD